# Patient Record
Sex: FEMALE | Race: WHITE | HISPANIC OR LATINO | Employment: FULL TIME | URBAN - METROPOLITAN AREA
[De-identification: names, ages, dates, MRNs, and addresses within clinical notes are randomized per-mention and may not be internally consistent; named-entity substitution may affect disease eponyms.]

---

## 2017-10-05 ENCOUNTER — GENERIC CONVERSION - ENCOUNTER (OUTPATIENT)
Dept: OTHER | Facility: OTHER | Age: 27
End: 2017-10-05

## 2017-10-10 ENCOUNTER — GENERIC CONVERSION - ENCOUNTER (OUTPATIENT)
Dept: OTHER | Facility: OTHER | Age: 27
End: 2017-10-10

## 2017-10-10 ENCOUNTER — APPOINTMENT (OUTPATIENT)
Dept: PERINATAL CARE | Facility: CLINIC | Age: 27
End: 2017-10-10
Payer: COMMERCIAL

## 2017-10-10 PROCEDURE — 76801 OB US < 14 WKS SINGLE FETUS: CPT | Performed by: OBSTETRICS & GYNECOLOGY

## 2018-01-10 NOTE — PROGRESS NOTES
OCT 10 2017         RE: Ralf Amin                                To: LEYLA Aguilar    MR#: [id number]                                   [address] [facility]    : [address], [address] [facility]   ENC: [phone]:[facility]                             Fax: [phone]   (Exam #: [id number])      The LMP of this 32year old,  G2, P1-0-0-1 patient was 2017, her   working KIN is MAY 8 2018 and the current gestational age is 9 weeks 0   days by [address] [place] [address]  A sonographic examination was performed on OCT   10 2017 using real time equipment  The ultrasound examination was   performed using abdominal technique  The patient has a BMI of 20 8  Her   blood pressure today was 106/67  Earliest US on record:  17  8w1d  KIN 18 Multiple longitudinal   and transverse sections revealed a conde intrauterine pregnancy  A normal gestational sac was documented  A smaller than expected fetal   pole was visualized  Cardiac motion was not observed  The yolk sac was not   seen  INDICATIONS      viability      Exam Types      Level I      MEASUREMENTS (* Included In Average GA)      CRL              2 2 cm        8 weeks 5 days *      THE AVERAGE GESTATIONAL AGE is 8 weeks 5 days +/- 5 days  ANATOMY COMMENTS      Transabdominal sonography reveals a single non-viable intrauterine   pregnancy  The crown-rump length is consistent with 8-5/7 weeks size  Absence of fetal heart rate activity is verified on real-time evaluation,   M-mode and color Doppler assessment  The gestational sac appears normal    The uterine contour appears normal  Free fluid is not identified in the   posterior cul-de-sac  There is no suspicion of a uterine myoma  The right   ovary is normal in appearance  The left ovary cannot be imaged well  The   ultrasound findings were consistent with a missed   ADNEXA      The left ovary was not visualized   The right ovary appeared normal and   measured 3 0 x 1 9 x 1 4 cm with a volume of 4 2 cc  IMPRESSION      Funk IUP   8 weeks and 5 days by this ultrasound  (KIN=MAY 17 2018)   10 weeks and 0 days by 1st Tri Sono  (KIN=MAY 8 2018)   Heart movement not seen      NIKKI Huang M D     Maternal-Fetal Medicine   Electronically signed 10/10/17 12:05

## 2018-01-12 NOTE — PROGRESS NOTES
2016         RE: Portillo Balta                                To: LEYLA Hopson    MR#: [id number]                                   [address]: [address], [address] [facility]   ENC:                                              Fax: [phone]   (Exam #: [id number])      The LMP of this 30year old,  1, para 0 patient was MAY 8 2015,   giving her an KIN of 2016 and a current gestational age of 43 weeks   5 days by dates  A sonographic examination was performed on 2016   using real time equipment  The ultrasound examination was performed using   abdominal technique  Earliest ultrasound found in her record: 7/2/15 7e3d 2/15/16 KIN            Cardiac motion was observed at 148 bpm       INDICATIONS      abnormal uterine Doppler   post dates   fetal growth      Exam Types      Level I   Biophysical Profile      RESULTS      Fetus # 1 of 1   Vertex presentation   Possible macrosomia   Placenta Location = Posterior   No placenta previa   Placenta Grade = II      MEASUREMENTS (* Included In Average GA)      AC              37 4 cm        41 weeks 5 days*   BPD             10 3 cm        42 weeks 4 days*   HC              35 9 cm        41 weeks 4 days*   Femur            7 8 cm        38 weeks 6 days*      Humerus          6 8 cm        39 weeks 2 days      Cerebellum       5 7 cm        36 weeks 1 day      HC/AC           0 96   FL/AC           0 21   FL/BPD          0 75   EFW (Ac/Fl/Hc)  4254 grams - 9 lbs 6 oz      THE AVERAGE GESTATIONAL AGE is 41 weeks 1 day +/- 21 days        AMNIOTIC FLUID      Q1: 6 1      Q2: 4 4      Q3: 7 0      Q4:   KEIRA Total = 17 5 cm   Amniotic Fluid: Normal      FETAL VESSELS                                     S/D   PI    RI    PSV   AEDV RF                                                    cm/s       Umbilical Artery                 0 77              No   No      ANATOMY Head                                    Normal   Stomach                                 Normal   Right Kidney                            Normal   Left Kidney                             Normal   Bladder                                 Normal   Placenta                                Normal      ANATOMY DETAILS      Visualized Appearing Sonographically Normal:   HEAD: (Calvarium, BPD Level, Lateral Ventricles, Choroid Plexus,   Cerebellum, Cisterna Magna);    STOMACH, RIGHT KIDNEY, LEFT KIDNEY,   BLADDER, PLACENTA      BIOPHYSICAL PROFILE      The Biophysical Profile score was 8/8  Breathin  Movement: 2  Tone: 2  AFV: 2      IMPRESSION      Funk IUP   41 weeks and 1 day by this ultrasound  (KIN=2016)   Vertex presentation   Possible macrosomia   Regular fetal heart rate of 148 bpm   Posterior placenta   No placenta previa      GENERAL COMMENT      I had the pleasure of speaking to Danny Alvarez and her  today  Her back is currently 40 weeks and 5 days  Today on the ultrasound the   fetus was vertex in presentation  The fetus has an estimated weight of 9   lbs  6 oz , at 4254 grams  The amniotic fluid appeared normal and the   umbilical artery Doppler flow study was normal  The nonstress test was   reactive today as well  Overall I was concerned about the size of this fetus and the potential   risk for shoulder dystocia  I did tell Ronyarleen Cuellarnazsara to have a discussion with   you regarding the options of delivery  I told her personally that my   option would be to do a primary  section although I do believe she   wants to come and talk to you  We discussed kick counts in the office today  We discussed the 10 kicks in   2 hour rule  I asked her to call your office if criteria are not met  She   should continue to do her kick counts on a daily basis        Total face-to-face time with the patient, excluding ultrasound time was 15   minutes with more than 50% of the time devoted to counseling and   coordination of care  Thank you very much for allowing me to participate in the care of your   patient  If you have any questions or concerns about today's visit, please   do not hesitate to call me  Sincerely,      TIMO Lomas R D M S Judge Ruths, M D     Maternal-Fetal Medicine   Electronically signed 02/17/16 10:51

## 2018-01-22 VITALS
DIASTOLIC BLOOD PRESSURE: 67 MMHG | HEIGHT: 64 IN | WEIGHT: 121 LBS | SYSTOLIC BLOOD PRESSURE: 106 MMHG | BODY MASS INDEX: 20.66 KG/M2

## 2018-09-17 ENCOUNTER — TRANSCRIBE ORDERS (OUTPATIENT)
Dept: PERINATAL CARE | Facility: CLINIC | Age: 28
End: 2018-09-17

## 2018-09-17 DIAGNOSIS — O09.90 HIGH-RISK PREGNANCY SUPERVISION, UNSPECIFIED TRIMESTER: Primary | ICD-10-CM

## 2018-09-20 ENCOUNTER — ROUTINE PRENATAL (OUTPATIENT)
Dept: PERINATAL CARE | Facility: CLINIC | Age: 28
End: 2018-09-20
Payer: COMMERCIAL

## 2018-09-20 VITALS
WEIGHT: 124 LBS | SYSTOLIC BLOOD PRESSURE: 111 MMHG | HEART RATE: 90 BPM | HEIGHT: 63 IN | BODY MASS INDEX: 21.97 KG/M2 | DIASTOLIC BLOOD PRESSURE: 76 MMHG

## 2018-09-20 DIAGNOSIS — O09.90 HIGH-RISK PREGNANCY SUPERVISION, UNSPECIFIED TRIMESTER: ICD-10-CM

## 2018-09-20 DIAGNOSIS — Z3A.01 7 WEEKS GESTATION OF PREGNANCY: Primary | ICD-10-CM

## 2018-09-20 PROCEDURE — 76801 OB US < 14 WKS SINGLE FETUS: CPT | Performed by: OBSTETRICS & GYNECOLOGY

## 2018-09-20 RX ORDER — ASPIRIN 81 MG/1
81 TABLET ORAL DAILY
COMMUNITY
End: 2021-04-12 | Stop reason: ALTCHOICE

## 2018-09-20 NOTE — PROGRESS NOTES
An ultrasound for viability, dating  was completed today  See Ob Procedures in EPIC  Ultrasound:  1  Viable fetus with CRL= 1 5 cm = 7 weeks 6 days KIN 05-03-19  I reviewed the results of this ultrasound with Ms Renetta Scanlon and her partner  She declined an appointment at 12-14 weeks to screen for fetal aneuploidy  Recommendations;  1  Follow-up  ultrasound at 20 weeks for fetal anatomy which was scheduled today      Mehnaz Bentley MD

## 2018-12-14 ENCOUNTER — ROUTINE PRENATAL (OUTPATIENT)
Dept: PERINATAL CARE | Facility: CLINIC | Age: 28
End: 2018-12-14
Payer: COMMERCIAL

## 2018-12-14 VITALS
BODY MASS INDEX: 23.81 KG/M2 | WEIGHT: 134.4 LBS | SYSTOLIC BLOOD PRESSURE: 116 MMHG | HEIGHT: 63 IN | DIASTOLIC BLOOD PRESSURE: 75 MMHG | HEART RATE: 73 BPM

## 2018-12-14 DIAGNOSIS — Z36.86 ENCOUNTER FOR ANTENATAL SCREENING FOR CERVICAL LENGTH: ICD-10-CM

## 2018-12-14 DIAGNOSIS — O34.211 MATERNAL CARE DUE TO LOW TRANSVERSE UTERINE SCAR FROM PREVIOUS CESAREAN DELIVERY: ICD-10-CM

## 2018-12-14 DIAGNOSIS — Z36.3 ENCOUNTER FOR ANTENATAL SCREENING FOR MALFORMATION USING ULTRASOUND: Primary | ICD-10-CM

## 2018-12-14 DIAGNOSIS — Z3A.20 20 WEEKS GESTATION OF PREGNANCY: ICD-10-CM

## 2018-12-14 PROBLEM — O99.210 OBESITY AFFECTING PREGNANCY, ANTEPARTUM: Status: RESOLVED | Noted: 2018-12-14 | Resolved: 2018-12-14

## 2018-12-14 PROBLEM — O99.210 OBESITY AFFECTING PREGNANCY, ANTEPARTUM: Status: ACTIVE | Noted: 2018-12-14

## 2018-12-14 PROCEDURE — 76817 TRANSVAGINAL US OBSTETRIC: CPT | Performed by: OBSTETRICS & GYNECOLOGY

## 2018-12-14 PROCEDURE — 76805 OB US >/= 14 WKS SNGL FETUS: CPT | Performed by: OBSTETRICS & GYNECOLOGY

## 2018-12-14 PROCEDURE — 99212 OFFICE O/P EST SF 10 MIN: CPT | Performed by: OBSTETRICS & GYNECOLOGY

## 2018-12-14 NOTE — PROGRESS NOTES
The patient was seen today for an ultrasound  Please see ultrasound report (located under Ob Procedures) for additional details  Thank you very much for allowing us to participate in the care of this very nice patient  Should you have any questions, please do not hesitate to contact me  Darrell Maza MD [address] [facility]  Attending Physician, Hakeem

## 2018-12-14 NOTE — PROGRESS NOTES
A transvaginal ultrasound was performed   Sonographer note on use of High Level Disinfection Process (Trophon) for transvaginal probe# 2 used, serial #[id number]  Blank Lara RDMS

## 2019-02-15 ENCOUNTER — TRANSCRIBE ORDERS (OUTPATIENT)
Dept: PERINATAL CARE | Facility: CLINIC | Age: 29
End: 2019-02-15

## 2019-02-18 ENCOUNTER — TRANSCRIBE ORDERS (OUTPATIENT)
Dept: PERINATAL CARE | Facility: CLINIC | Age: 29
End: 2019-02-18

## 2019-02-18 DIAGNOSIS — O99.810 ABNORMAL MATERNAL GLUCOSE TOLERANCE, ANTEPARTUM: Primary | ICD-10-CM

## 2019-02-19 ENCOUNTER — OFFICE VISIT (OUTPATIENT)
Dept: PERINATAL CARE | Facility: CLINIC | Age: 29
End: 2019-02-19
Payer: COMMERCIAL

## 2019-02-19 DIAGNOSIS — Z3A.29 29 WEEKS GESTATION OF PREGNANCY: ICD-10-CM

## 2019-02-19 DIAGNOSIS — O24.410 DIET CONTROLLED GESTATIONAL DIABETES MELLITUS (GDM) IN THIRD TRIMESTER: Primary | ICD-10-CM

## 2019-02-19 DIAGNOSIS — O99.810 ABNORMAL MATERNAL GLUCOSE TOLERANCE, ANTEPARTUM: ICD-10-CM

## 2019-02-19 PROCEDURE — G0109 DIAB MANAGE TRN IND/GROUP: HCPCS | Performed by: DIETITIAN, REGISTERED

## 2019-02-19 NOTE — PROGRESS NOTES
19  Denisha Duran   1990  Estimated Date of Delivery: 5/3/2019    30 4/7 weeks    Dear Dr Jaida Mckinley: Thank you for referring your patient to the Diabetes and Pregnancy Program at [address] [place] [address]  The patient attended class 1 and patient received the following education:     Pathophysiology of diabetes and pregnancy  This includes maternal-fetal complications such as fetal macrosomia,  hypoglycemia, polyhydramnios, increased incidence of  section, pre-term labor and in severe cases, fetal demise and stillbirth   Instruction on diet and glucometer use was provided  Self-monitoring of blood glucose levels: fasting (goal 60mg/dl to 90mg/dl) and two hours after the start of the meal less (goal less than 120mg/dl)  The patient was provided with a One-Touch Verio blood glucose meter and supplies  Blood glucose during demonstration was 97    Medical Nutrition Therapy for diabetes and pregnancy  The patient was provided with a 1900 calorie meal plan and the following was reviewed:     o Basic review of macronutrients   o Meal pattern should consist of three small meals and three snacks daily  o Carbohydrate gram amounts per meal   o Appropriate serving sizes for carbohydrates and proteins  o Incorporating protein at each meal and snack  o Maintain a three day food diary and bring to class 2    Report blood glucose levels [facility] the [facility] or as directed:  o Phone : [phone]  If no response in 24 hours, call [phone]   o Fax: [phone]  o Email: [email or web address]  The patient is scheduled to attend class 2 on Tuesday, 19  Additionally, fetal ultrasound evaluation by the Perinatologist has been scheduled to assure continuity of care  Please contact the Diabetes and Pregnancy Program at [phone] if you have any questions    Time spent with patient 2:15-3:25 PM; time spent face to face counseling greater than 50% of the appointment      Sincerely,   Hola Bojorquez  Diabetes Educator   Diabetes and Pregnancy Program

## 2019-02-21 RX ORDER — BLOOD SUGAR DIAGNOSTIC
STRIP MISCELLANEOUS
Qty: 100 EACH | Refills: 4 | Status: SHIPPED | OUTPATIENT
Start: 2019-02-21 | End: 2019-05-03

## 2019-02-21 RX ORDER — LANCETS 33 GAUGE
EACH MISCELLANEOUS
Qty: 100 EACH | Refills: 3 | Status: SHIPPED | OUTPATIENT
Start: 2019-02-21 | End: 2019-05-03

## 2019-02-26 ENCOUNTER — DOCUMENTATION (OUTPATIENT)
Dept: PERINATAL CARE | Facility: CLINIC | Age: 29
End: 2019-02-26

## 2019-02-26 ENCOUNTER — OFFICE VISIT (OUTPATIENT)
Dept: PERINATAL CARE | Facility: CLINIC | Age: 29
End: 2019-02-26
Payer: COMMERCIAL

## 2019-02-26 DIAGNOSIS — O24.410 DIET CONTROLLED GESTATIONAL DIABETES MELLITUS (GDM) IN THIRD TRIMESTER: Primary | ICD-10-CM

## 2019-02-26 DIAGNOSIS — Z3A.30 30 WEEKS GESTATION OF PREGNANCY: ICD-10-CM

## 2019-02-26 PROCEDURE — G0109 DIAB MANAGE TRN IND/GROUP: HCPCS | Performed by: DIETITIAN, REGISTERED

## 2019-02-26 NOTE — PROGRESS NOTES
Date:  19  RE: Mariia Vasquez    : 1990  Estimated Date of Delivery: 5/3/2019  EGA: 30 4/7 weeks  OB/GYN: Angie Cabrera  Diet controlled gestational diabetes      Date Fasting Post-  breakfast Post-  lunch Post-  dinner Before bedtime Carbs Comments   19   97 95      19 81 67 114 84      19 77   90   No Strips   19 84 90 100 85      19 86 76 97 95      19 87 70 116 81      19 83 87 100 95      19 75 99            Current regimen:  1900 calorie GDM diet with 3 meals & 3 snacks  Self-Blood Glucose monitoring 4 times daily    Plan:  Continue current regimen  Advised patient to add up to 5 oz protein at both lunch & dinner  May need more calories due to active job  Lost 4 pounds in 1 week per patient      Date due to report next:  Tuesday, 3/5/19    Tom Patel  Diabetes Educator   Diabetes and Pregnancy Program

## 2019-02-26 NOTE — PROGRESS NOTES
DATE:  19  RE: Bishop Abarca    : 1990    KIN: Estimated Date of Delivery: 5/3/2019    EGA: 30 4/7 weeks    Dear Dr Niyah Carlos: Thank you for referring your patient to the Diabetes and Pregnancy Program at [address]  The patient attended Class 2 received the following education:    Weight gain during in pregnancy  Based on the patients height of 63   inches, pre-pregnancy weight of 124 pounds (BMI-22 0), we would recommend a total weight gain of 25-35 pounds for the pregnancy   The patients current weight is 150  pounds, and her weight gain to date is 26 pounds   Medical Nutrition Therapy for diabetes and pregnancy  The patients three day food diary was reviewed and discussed  The patient was instructed on the following:  o Individualized meal plan    o Use of food diary to maintain a meal plan    o Importance of protein as it relates to blood glucose control   Review of blood glucose log  Reinforcement of blood glucose goals and reporting guidelines   Ultrasounds every four weeks in the [facility] to evaluate fetal growth   Exercise Guidelines:   o Walking up to thirty minutes daily can reduce blood glucose levels  o Monitor for greater than four contractions per hour     o The patient has been instructed not to begin physical activity if she has been instructed not to exercise by your office   Sick day guidelines and hypoglycemia with treatment   Post-partum guidelines:  o Completion of a 75 gram glucose tolerance test at 6 weeks post-partum to check for type 2 diabetes  o 20% weight loss and 30 minutes of exercise 5 times per week reduces the risk of type 2 diabetes   Breastfeeding guidelines   Report blood glucose levels to [address] weekly or as directed  o Phone: [phone]   If no response in 24 hours, call [phone]    o Fax: [phone]  o Email: [email or web address]    Patient brought long list of questions to this class  All questions were answered  Understands all information well  Please contact the Diabetes and Pregnancy Program at [phone] if you have questions  Time spent with patient 3:35-5:15 PM; time spent face to face counseling greater than 50% of the appointment      Sincerely,     Patrick Olivares  Diabetes Educator  Diabetes and Pregnancy Program

## 2019-02-28 ENCOUNTER — TRANSCRIBE ORDERS (OUTPATIENT)
Dept: PERINATAL CARE | Facility: CLINIC | Age: 29
End: 2019-02-28

## 2019-02-28 DIAGNOSIS — O09.90 SUPERVISION OF HIGH-RISK PREGNANCY: Primary | ICD-10-CM

## 2019-03-04 ENCOUNTER — DOCUMENTATION (OUTPATIENT)
Dept: PERINATAL CARE | Facility: CLINIC | Age: 29
End: 2019-03-04

## 2019-03-04 NOTE — PROGRESS NOTES
Date:  19  RE: Rafael Joaquin    : 1990  Estimated Date of Delivery: 5/3/2019  EGA: 31 3/ weeks  OB/GYN: Reji Farmer  Diet controlled gestational diabetes    Date Fasting Post-  breakfast Post-  lunch Post-  dinner Before bedtime Carbs Comments   19 83 87 100 95      19 81 75 99 100      19 82 92 73 92      19 90 93 81 105      3/1/19 80 90 105 76      3/2/19 89 74 110 99      3/3/19 87 77 90 76        Current regimen:  1900 calorie GDM diet with 3 meals & 3 snacks  Self-Blood Glucose monitoring 4 times daily    Plan:  Continue current regimen  Advised patient to add up to 5 oz protein at both lunch & dinner  May need more calories due to active job  Lost 4 pounds in 1 week per patient      Date due to report next:  Monday, 3/11/19    Connor Wood  Diabetes Educator   Diabetes and Pregnancy Program

## 2019-03-11 ENCOUNTER — DOCUMENTATION (OUTPATIENT)
Dept: PERINATAL CARE | Facility: CLINIC | Age: 29
End: 2019-03-11

## 2019-03-11 NOTE — PROGRESS NOTES
Date:  19  RE: Jose J Brock    : 1990  Estimated Date of Delivery: 5/3/2019  EGA: 32w3d  OB/GYN: Aline Dudley  Diet controlled gestational diabetes      Blood glucose log from patient e-mail  Current regimen:  1900 calorie GDM diet with 3 meals/snacks including protein, previously advised to consume 5 protein servings at bothe lunch and dinner due to active job and reported weight loss with diet initiation  Self-Blood Glucose monitoring fasting and 2 hours after start of meals with One touch Verio meter      Plan:  Continue current regimen    Date due to report next:  Monday, 3-18-19    Vidhi Patel, RN BS CDE  Diabetes Educator   Diabetes and Pregnancy Program

## 2019-03-14 ENCOUNTER — ULTRASOUND (OUTPATIENT)
Dept: PERINATAL CARE | Facility: CLINIC | Age: 29
End: 2019-03-14
Payer: COMMERCIAL

## 2019-03-14 VITALS
RESPIRATION RATE: 18 BRPM | HEIGHT: 63 IN | BODY MASS INDEX: 26.05 KG/M2 | HEART RATE: 77 BPM | DIASTOLIC BLOOD PRESSURE: 74 MMHG | WEIGHT: 147 LBS | SYSTOLIC BLOOD PRESSURE: 116 MMHG

## 2019-03-14 DIAGNOSIS — Z3A.32 32 WEEKS GESTATION OF PREGNANCY: ICD-10-CM

## 2019-03-14 DIAGNOSIS — O24.410 DIET CONTROLLED GESTATIONAL DIABETES MELLITUS (GDM) IN THIRD TRIMESTER: Primary | ICD-10-CM

## 2019-03-14 PROCEDURE — 76816 OB US FOLLOW-UP PER FETUS: CPT | Performed by: OBSTETRICS & GYNECOLOGY

## 2019-03-14 PROCEDURE — 99212 OFFICE O/P EST SF 10 MIN: CPT | Performed by: OBSTETRICS & GYNECOLOGY

## 2019-03-14 RX ORDER — FERROUS SULFATE 325(65) MG
325 TABLET ORAL 2 TIMES DAILY
COMMUNITY
End: 2021-04-12 | Stop reason: ALTCHOICE

## 2019-03-14 NOTE — PATIENT INSTRUCTIONS
Kick Counts in Pregnancy   AMBULATORY CARE:   Kick counts  measure how much your baby is moving in your womb  A kick from your baby can be felt as a twist, turn, swish, roll, or jab  It is common to feel your baby kicking at 26 to 28 weeks of pregnancy  You may feel your baby kick as early as 20 weeks of pregnancy  Seek care immediately if:   · You feel your baby kick less as the day goes on      · You do not feel any kicks in a day  Contact your healthcare provider if:   · You feel a change in the number of kicks or movements of your baby  · You feel fewer than 10 kicks within 2 hours after counting  · You have questions or concerns about your baby's movements  Why measure kick counts:  Your baby's movement may provide information about your baby's health  He may move less, or not at all, if there are problems  He may move less if he does not have enough room to grow in your uterus (womb)  He may also move less if he is not getting enough oxygen or nutrition from the placenta  Tell your healthcare provider as soon as you feel a change in your baby's movements  Problems that are found earlier are easier to treat  When to measure kick counts:   · Measure kick counts at the same time every day  · Measure kick counts when your baby is awake and most active  Your baby may be most active in the evening  · Measure kick counts after a meal or snack or when your baby is usually most active  Your baby may be more active after you eat or in the evening  · You should not smoke while you are pregnant  Smoking increases the risk of health problems for you and for your baby during your pregnancy  If you do smoke, wait 2 hours to measure kick counts  Nicotine can make your baby more active than usual   How to measure kick counts:  Check that your baby is awake before you measure kick counts  You can wake up your baby by lightly pushing on your belly, walking, or drinking something cold   Your healthcare provider may tell you different ways to measure kick counts  He/She may tell you to do the following:  · Use a chart or clock to keep track of the time you start and finish counting  · Sit in a chair or lie on your left side  · Place your hands on the largest part of your belly  · Count until you reach 10 kicks  Write down how much time it takes to count 10 kicks  · It may take 30 minutes to 2 hours to count 10 kicks  It should not take more than 2 hours to count 10 kicks  If you do not feel 10 kicks within 2 hours, Call your Obstetrician    Follow up with your healthcare provider as directed:  Write down your questions so you remember to ask them during your visits  © 2017 [address] [employer] Information is for End User's use only and may not be sold, redistributed or otherwise used for commercial purposes  All illustrations and images included in CareNotes® are the copyrighted property of A D A M , Inc  or Sherman Malave  The above information is an  only  It is not intended as medical advice for individual conditions or treatments  Talk to your doctor, nurse or pharmacist before following any medical regimen to see if it is safe and effective for you

## 2019-03-14 NOTE — PROGRESS NOTES
The patient was seen today for an ultrasound  Please see ultrasound report (located under Ob Procedures) for additional details  Thank you very much for allowing us to participate in the care of this very nice patient  Should you have any questions, please do not hesitate to contact me  Darrell Montero MD [address]  Attending Physician, [place]em

## 2019-03-18 ENCOUNTER — DOCUMENTATION (OUTPATIENT)
Dept: PERINATAL CARE | Facility: CLINIC | Age: 29
End: 2019-03-18

## 2019-03-18 NOTE — PROGRESS NOTES
Date:  19  RE: Zee Crowe    : 1990  Estimated Date of Delivery: 5/3/2019  EGA: 33w3d  OB/GYN: Daniel Rhodes  Diet controlled gestational diabetes      Blood glucose log from patient e-mail  Current regimen:  1900 calorie GDM diet with 3 meals/snacks including protein, previously advised to consume 5 protein servings at both lunch and dinner due to active job and reported weight loss with diet initiation  Self-Blood Glucose monitoring fasting and 2 hours after start of meals with One touch Verio meter      Plan:  Continue current regimen    Date due to report next:  Monday, 3-25-19    Vidhi Patel, RN BS CDE  Diabetes Educator   Diabetes and Pregnancy Program

## 2019-03-25 ENCOUNTER — DOCUMENTATION (OUTPATIENT)
Dept: PERINATAL CARE | Facility: CLINIC | Age: 29
End: 2019-03-25

## 2019-03-25 NOTE — PROGRESS NOTES
Wanda Trinidad is a  25 y o  female at 26w3d gestation, with an Estimated Date of Delivery: 5/3/19  Blood glucose log submission reviewed and updates to treatment plan provided as indicated  Reviewed and updated the following from patients medical record: PMH, Problem List, Allergies, and Current Medications  Diagnosis:  Diet Controlled GDM third trimester    Labs:  Lab Results   Component Value Date    [id number] 2015     No results found for: HGBA1C     Medications:  Current Outpatient Medications on File Prior to Visit   Medication Sig Dispense Refill    aspirin (ECOTRIN LOW STRENGTH) 81 mg EC tablet Take 81 mg by mouth daily      ferrous sulfate 325 (65 Fe) mg tablet Take 325 mg by mouth 2 (two) times a day      ONETOUCH DELICA LANCETS 38Y MISC Test 4 times daily  Diet controlled gestational diabetes  100 each 3    ONETOUCH VERIO test strip Test 4 times daily  Diet controlled gestational diabetes  100 each 4    Prenatal Vit-Iron Carbonyl-FA (PRENATAL MULTIVITAMIN) TABS Take 1 tablet by mouth daily  No current facility-administered medications on file prior to visit        Anthropometrics:  No components found for: LASTHT  Wt Readings from Last 3 Encounters:   19 66 7 kg (147 lb)   18 61 kg (134 lb 6 4 oz)   18 56 2 kg (124 lb)   Pregravid weight not on file    Recent Ultrasound for Growth Shows:   DATE:3/14- fetal growth appeared normal, normal KEIRA    Blood Sugar Logs Submitted via: Email  Dates: 3/18-3/24      Current regimen:  Meal Plan: 1900 calorie  Medication (if applicable):   N/A    Plan:   BG Testin x per day (Fasting, 2 hour after start of each meal)  Meal Plan: 1900 calories   Diet instruction: Continue current meal plan   - 3 meals and 3 snacks daily, including protein at each   - No more than 10 hours fasting overnight     Medication (if applicable):   N/A   Exercise: If okay by physician, recommend up to 30 minutes of physical activity daily      Upcoming Lab Work: none  Prescription Refills needed: None    Date to report next: Monday 4/1      Begin Time: 1:23 pm  End Time: 1:30 pm    Garrett Kussmaul RD, LDN  Diabetes Educator  [facility] Maternal Fetal Medicine

## 2019-04-01 ENCOUNTER — DOCUMENTATION (OUTPATIENT)
Dept: PERINATAL CARE | Facility: CLINIC | Age: 29
End: 2019-04-01

## 2019-04-05 ENCOUNTER — DOCUMENTATION (OUTPATIENT)
Dept: PERINATAL CARE | Facility: CLINIC | Age: 29
End: 2019-04-05

## 2019-04-05 ENCOUNTER — ULTRASOUND (OUTPATIENT)
Dept: PERINATAL CARE | Facility: CLINIC | Age: 29
End: 2019-04-05
Payer: COMMERCIAL

## 2019-04-05 VITALS
SYSTOLIC BLOOD PRESSURE: 95 MMHG | BODY MASS INDEX: 26.51 KG/M2 | HEIGHT: 63 IN | HEART RATE: 73 BPM | DIASTOLIC BLOOD PRESSURE: 55 MMHG | WEIGHT: 149.6 LBS

## 2019-04-05 DIAGNOSIS — Z98.891 HX OF CESAREAN SECTION: ICD-10-CM

## 2019-04-05 DIAGNOSIS — O24.410 DIET CONTROLLED GESTATIONAL DIABETES MELLITUS (GDM) IN THIRD TRIMESTER: Primary | ICD-10-CM

## 2019-04-05 DIAGNOSIS — O09.90 SUPERVISION OF HIGH-RISK PREGNANCY: ICD-10-CM

## 2019-04-05 DIAGNOSIS — Z3A.36 36 WEEKS GESTATION OF PREGNANCY: ICD-10-CM

## 2019-04-05 PROCEDURE — 76816 OB US FOLLOW-UP PER FETUS: CPT | Performed by: OBSTETRICS & GYNECOLOGY

## 2019-04-05 PROCEDURE — 99212 OFFICE O/P EST SF 10 MIN: CPT | Performed by: OBSTETRICS & GYNECOLOGY

## 2019-04-08 ENCOUNTER — DOCUMENTATION (OUTPATIENT)
Dept: PERINATAL CARE | Facility: CLINIC | Age: 29
End: 2019-04-08

## 2019-04-15 ENCOUNTER — DOCUMENTATION (OUTPATIENT)
Dept: PERINATAL CARE | Facility: CLINIC | Age: 29
End: 2019-04-15

## 2019-04-22 ENCOUNTER — DOCUMENTATION (OUTPATIENT)
Dept: PERINATAL CARE | Facility: CLINIC | Age: 29
End: 2019-04-22

## 2019-04-29 ENCOUNTER — DOCUMENTATION (OUTPATIENT)
Dept: PERINATAL CARE | Facility: CLINIC | Age: 29
End: 2019-04-29

## 2021-01-15 ENCOUNTER — TRANSCRIBE ORDERS (OUTPATIENT)
Dept: PERINATAL CARE | Facility: CLINIC | Age: 31
End: 2021-01-15

## 2021-01-15 DIAGNOSIS — O09.899 SUPERVISION OF OTHER HIGH RISK PREGNANCIES, UNSPECIFIED TRIMESTER: Primary | ICD-10-CM

## 2021-04-12 ENCOUNTER — ROUTINE PRENATAL (OUTPATIENT)
Dept: PERINATAL CARE | Facility: CLINIC | Age: 31
End: 2021-04-12
Payer: COMMERCIAL

## 2021-04-12 VITALS
DIASTOLIC BLOOD PRESSURE: 64 MMHG | WEIGHT: 145.2 LBS | SYSTOLIC BLOOD PRESSURE: 115 MMHG | HEART RATE: 77 BPM | HEIGHT: 63 IN | BODY MASS INDEX: 25.73 KG/M2

## 2021-04-12 DIAGNOSIS — O09.292 PRIOR FETAL MACROSOMIA IN SECOND TRIMESTER, ANTEPARTUM: ICD-10-CM

## 2021-04-12 DIAGNOSIS — O09.292 HISTORY OF GESTATIONAL DIABETES MELLITUS (GDM) IN PRIOR PREGNANCY, CURRENTLY PREGNANT IN SECOND TRIMESTER: ICD-10-CM

## 2021-04-12 DIAGNOSIS — Z3A.20 20 WEEKS GESTATION OF PREGNANCY: ICD-10-CM

## 2021-04-12 DIAGNOSIS — O09.899 SUPERVISION OF OTHER HIGH RISK PREGNANCIES, UNSPECIFIED TRIMESTER: ICD-10-CM

## 2021-04-12 DIAGNOSIS — Z36.86 ENCOUNTER FOR ANTENATAL SCREENING FOR CERVICAL LENGTH: ICD-10-CM

## 2021-04-12 DIAGNOSIS — O34.211 MATERNAL CARE DUE TO LOW TRANSVERSE UTERINE SCAR FROM PREVIOUS CESAREAN DELIVERY: Primary | ICD-10-CM

## 2021-04-12 DIAGNOSIS — Z86.32 HISTORY OF GESTATIONAL DIABETES MELLITUS (GDM) IN PRIOR PREGNANCY, CURRENTLY PREGNANT IN SECOND TRIMESTER: ICD-10-CM

## 2021-04-12 PROBLEM — G43.909 MIGRAINES: Status: ACTIVE | Noted: 2019-04-29

## 2021-04-12 PROCEDURE — 99241 PR OFFICE CONSULTATION NEW/ESTAB PATIENT 15 MIN: CPT | Performed by: OBSTETRICS & GYNECOLOGY

## 2021-04-12 PROCEDURE — 76817 TRANSVAGINAL US OBSTETRIC: CPT | Performed by: OBSTETRICS & GYNECOLOGY

## 2021-04-12 PROCEDURE — 76811 OB US DETAILED SNGL FETUS: CPT | Performed by: OBSTETRICS & GYNECOLOGY

## 2021-04-12 NOTE — LETTER
2021     Bennettanushamary Thapa   [place]  [address] [place] [address] [address]    Patient: Chelsi Agee   YOB: 1990   Date of Visit: 2021       Dear Dr Syed Lira: Thank you for referring Joselo Dallas to me for evaluation  Below are my notes for this consultation  If you have questions, please do not hesitate to call me  I look forward to following your patient along with you  Sincerely,        Yolanda Cronin MD        CC: No Recipients  Yolanda Cronin MD  2021 10:21 PM  Sign when Signing Visit  OFFICE CONSULT  Man[facility] [place]  [place],  [address]     Dear Dr Syed Lira     Thank you for requesting a  consultation on your patient Chelsi Agee for the following indications:  Fetal anatomical survey    History   Dany Fernandez reports that she is on prenatal vitamins and has an allergy to amoxicillin  Her medical history includes gestational diabetes in a prior pregnancy  Surgical history includes knee surgery x3 and a history of a  section  Her OB history includes a 41 week  section for a baby boy on 16  Her son weighed 9 pounds 12 ounces  She states  She had a borderline screen for gestational diabetes in that pregnancy  On 19 at 39 weeks and 4 days she had a 7 pound 4 ounce baby girl vaginally without complication  She reports she did have gestational diabetes which was well controlled on diet in this pregnancy  In 2017 at 10 weeks she had a miscarriage that did not require a D&E and in 2020 at 6 weeks she also had a another miscarriage that did not require a D&E  She denies any use of cigarettes, alcohol or illicit drugs in pregnancy  She reports no 1st generation family history of issues  She declined any genetic screening in this pregnancy    She reports she  Had chronic migraines earlier in pregnancy that responded to Fioricet but recently they seemed to have decreased in frequency  She is planning on a  with this pregnancy  Ultrasound findings: the ultrasound today shows a fetus that is growing concordant with her dates  The cervical length, amniotic fluid and placental location appear normal   No malformations are detected on today's ultrasound  The patient was informed of the findings and counseled about the limitations of the exam in detecting all forms of fetal congenital abnormalities  She does not report any vaginal bleeding or uterine cramping/contractions  She does feel fetal movement  Follow up recommended:   Recommend a follow-up ultrasound for growth and missed anatomy at 28 weeks given her history of a prior macrosomic baby and her history of gestational diabetes and a prior   The majority of time (greater then 50%) was spent on counseling and coordination of care of this patient and /or family member  Approximate face to face time was 15 minutes            Zohaib Dsouza MD

## 2021-04-13 NOTE — PROGRESS NOTES
OFFICE CONSULT  Lizbeth Stanley, Do  [facility],  [address]     Dear Dr Libby Gore     Thank you for requesting a  consultation on your patient Nicole Johnson for the following indications:  Fetal anatomical survey    History   Isiah Crespo reports that she is on prenatal vitamins and has an allergy to amoxicillin  Her medical history includes gestational diabetes in a prior pregnancy  Surgical history includes knee surgery x3 and a history of a  section  Her OB history includes a 41 week  section for a baby boy on 16  Her son weighed 9 pounds 12 ounces  She states  She had a borderline screen for gestational diabetes in that pregnancy  On 19 at 39 weeks and 4 days she had a 7 pound 4 ounce baby girl vaginally without complication  She reports she did have gestational diabetes which was well controlled on diet in this pregnancy  In 2017 at 10 weeks she had a miscarriage that did not require a D&E and in 2020 at 6 weeks she also had a another miscarriage that did not require a D&E  She denies any use of cigarettes, alcohol or illicit drugs in pregnancy  She reports no 1st generation family history of issues  She declined any genetic screening in this pregnancy  She reports she  Had chronic migraines earlier in pregnancy that responded to Fioricet but recently they seemed to have decreased in frequency  She is planning on a  with this pregnancy  Ultrasound findings: the ultrasound today shows a fetus that is growing concordant with her dates  The cervical length, amniotic fluid and placental location appear normal   No malformations are detected on today's ultrasound  The patient was informed of the findings and counseled about the limitations of the exam in detecting all forms of fetal congenital abnormalities      She does not report any vaginal bleeding or uterine cramping/contractions  She does feel fetal movement  Follow up recommended:   Recommend a follow-up ultrasound for growth and missed anatomy at 28 weeks given her history of a prior macrosomic baby and her history of gestational diabetes and a prior   The majority of time (greater then 50%) was spent on counseling and coordination of care of this patient and /or family member  Approximate face to face time was 15 minutes            Rolanda Williamson MD

## 2021-06-07 ENCOUNTER — TELEPHONE (OUTPATIENT)
Dept: PERINATAL CARE | Facility: CLINIC | Age: 31
End: 2021-06-07

## 2021-06-07 NOTE — TELEPHONE ENCOUNTER
Dany Fernandez called [facility], states she was exposed to Covid positive person last Monday () , was tested 21 not resulted yet  Started last night with congestion and runny nose, denies fever  Patient reports she does get allergies and symptoms may just be her allergies  Discussed rescheduling [facility] appt for today ( 28 week growth) she states baby active and meets kick counts, denies s/sx  labor and knows to seek emergency care any shortness of breath or chest discomfort  Dany Fernandez rescheduled [facility] appt due to covid screen pending and symptomatic- new appt  21  Reviewed with patient if covid test is positive, [facility] provider can offer virtual visit  Patient verbalized understanding of all information  Call routed to [facility] provider Dr Yolanda Cronin

## 2021-06-21 ENCOUNTER — ULTRASOUND (OUTPATIENT)
Dept: PERINATAL CARE | Facility: OTHER | Age: 31
End: 2021-06-21
Payer: COMMERCIAL

## 2021-06-21 VITALS
HEIGHT: 63 IN | HEART RATE: 89 BPM | WEIGHT: 161 LBS | DIASTOLIC BLOOD PRESSURE: 75 MMHG | SYSTOLIC BLOOD PRESSURE: 114 MMHG | BODY MASS INDEX: 28.53 KG/M2

## 2021-06-21 DIAGNOSIS — Z3A.30 30 WEEKS GESTATION OF PREGNANCY: ICD-10-CM

## 2021-06-21 DIAGNOSIS — O09.292 PRIOR FETAL MACROSOMIA IN SECOND TRIMESTER, ANTEPARTUM: Primary | ICD-10-CM

## 2021-06-21 PROCEDURE — 76816 OB US FOLLOW-UP PER FETUS: CPT | Performed by: OBSTETRICS & GYNECOLOGY

## 2021-06-21 PROCEDURE — 99213 OFFICE O/P EST LOW 20 MIN: CPT | Performed by: OBSTETRICS & GYNECOLOGY

## 2021-06-30 ENCOUNTER — ULTRASOUND (OUTPATIENT)
Dept: PERINATAL CARE | Facility: CLINIC | Age: 31
End: 2021-06-30
Payer: COMMERCIAL

## 2021-06-30 VITALS
HEIGHT: 63 IN | DIASTOLIC BLOOD PRESSURE: 59 MMHG | WEIGHT: 163.2 LBS | HEART RATE: 74 BPM | BODY MASS INDEX: 28.92 KG/M2 | SYSTOLIC BLOOD PRESSURE: 104 MMHG

## 2021-06-30 DIAGNOSIS — U07.1 COVID-19 AFFECTING PREGNANCY IN THIRD TRIMESTER: Primary | ICD-10-CM

## 2021-06-30 DIAGNOSIS — Z3A.32 32 WEEKS GESTATION OF PREGNANCY: ICD-10-CM

## 2021-06-30 DIAGNOSIS — O98.513 COVID-19 AFFECTING PREGNANCY IN THIRD TRIMESTER: Primary | ICD-10-CM

## 2021-06-30 PROCEDURE — 59025 FETAL NON-STRESS TEST: CPT | Performed by: OBSTETRICS & GYNECOLOGY

## 2021-06-30 PROCEDURE — 76815 OB US LIMITED FETUS(S): CPT | Performed by: OBSTETRICS & GYNECOLOGY

## 2021-06-30 NOTE — LETTER
June 30, 2021     [facility]  [address] [place], [place] [address]    Patient: Aiden Carrillo   YOB: 1990   Date of Visit: 6/30/2021       Dear Dr Valerie Suárez: Thank you for referring Warden Villanueva to me for evaluation  Below are my notes for this consultation  Please call us today when you get a moment (nonurgently); I would advise discontinuation of fetal monitoring for the indication of COVID infection  We are at [phone] [facility])  If you have questions, please do not hesitate to call me  Sincerely,        Carlos Pagan MD        CC: No Recipients  Carlos Pagan MD  6/30/2021  8:55 AM  Sign when Signing Visit  Reviewed referral request for fetal monitoring for the indication of recent COVID infection  Enriqueta Kimbrough did not require hospitalization and has recovered  Would advise expectant management at this time and not continue fetal monitoring for this indication  Dr Evelyn Stovall had advised final growth assessment 6 weeks from 6/21/21   Carlos Pagan MD

## 2021-06-30 NOTE — PROGRESS NOTES
NST is reactive and amniotic fluid volume is within normal limits  Reviewed referral request for fetal monitoring for the indication of recent COVID infection  Harsh Krueger did not require hospitalization and has recovered  Would advise expectant management at this time and not continue fetal monitoring for this indication  Discussed with patient; prior to cancelling any appointments will speak first with her primary OB  Dr David Ames had advised final growth assessment 6 weeks from 6/21/21   Dunia Marx MD

## 2021-06-30 NOTE — PATIENT INSTRUCTIONS
Nonstress Test for Pregnancy   WHAT YOU NEED TO KNOW:   What do I need to know about a nonstress test?  A nonstress test measures your baby's heart rate and movements  Nonstress means that no stress will be placed on your baby during the test    How do I prepare for a nonstress test?  Your healthcare provider will talk to you about how to prepare for this test  He may tell you to eat and drink plenty of fluids before your test  If you smoke, you may be asked not to smoke within 2 hours before the test  He will also tell you what medicines to take or not take on the day of your test    What will happen during a nonstress test?  You may be asked to lie down or recline back for the test on a bed  One or two belts with sensors will be placed around your abdomen  Your baby's heart rate will be recorded with a machine  If your baby does not move, your baby may be asleep  Your healthcare provider may make a noise near your abdomen to try to wake your baby  The test usually takes about 20 minutes, but can take longer if your baby needs to be awakened  What do I need to know about the test results? Your baby will be expected to move at least twice for a certain amount of time  Your baby's heart rate will be expected to go up by a certain number of beats per minute during movement  If your baby does not move as expected, the test may need to be repeated or you may need other tests  CARE AGREEMENT:   You have the right to help plan your care  Learn about your health condition and how it may be treated  Discuss treatment options with your healthcare providers to decide what care you want to receive  You always have the right to refuse treatment  The above information is an  only  It is not intended as medical advice for individual conditions or treatments  Talk to your doctor, nurse or pharmacist before following any medical regimen to see if it is safe and effective for you    © Copyright [address] [place] [address] Drive Information is for End User's use only and may not be sold, redistributed or otherwise used for commercial purposes   All illustrations and images included in CareNotes® are the copyrighted property of A D A M , Inc  or [facility] Fiona Jansen

## 2021-06-30 NOTE — PROGRESS NOTES
Non-Stress Testing:    Non-Stress test, equipment, procedure, and expected outcomes reviewed  Reviewed fetal kick counts and when to call OB  Gemini Blackburn Verified patient understanding of fetal kick counts with teach back method  Patient reports feeling daily fetal movements  Patient has no questions or concerns

## 2021-06-30 NOTE — LETTER
June 30, 2021     [place] DO  [facility] [id number]    Patient: Nhung Moser   YOB: 1990   Date of Visit: 6/30/2021       Dear Dr Sherrie Romberg: Thank you for referring Levon Zavala to me for evaluation  Below are my notes for this consultation  Please call us today when you get a moment (nonurgently); I would advise discontinuation of fetal monitoring for the indication of COVID infection  We are at [phone] [facility])  Sincerely,        Denis Moser MD        CC: No Recipients  Denis Moser MD  6/30/2021  9:04 AM  Sign when Signing Visit  NST is reactive and amniotic fluid volume is within normal limits  Reviewed referral request for fetal monitoring for the indication of recent COVID infection  Sarah Chamber did not require hospitalization and has recovered  Would advise expectant management at this time and not continue fetal monitoring for this indication  Discussed with patient; prior to cancelling any appointments will speak first with her primary OB  Dr Joanna Green had advised final growth assessment 6 weeks from 6/21/21   Denis Moser MD

## 2021-06-30 NOTE — LETTER
June 30, 2021     [place] Michie[place] Chuyita [address] [address]    Patient: Corrinne Fortis   YOB: 1990   Date of Visit: 6/30/2021       Dear Dr Tovar Maldivian: Thank you for referring Jacky Dominguez to me for evaluation  Below are my notes for this consultation  If you have questions, please do not hesitate to call me  I look forward to following your patient along with you  Sincerely,        Bertha Gilliland MD        CC: No Recipients  Bertha Gilliland MD  6/30/2021  8:48 AM  Sign when Signing Visit  Reviewed referral request for fetal monitoring for the indication of recent COVID infection  As this is not a typical indication for fetal monitoring at this time therefore would advise expectant management  Dr Edgar Montero had advised final growth assessment 6 weeks from 6/21/21   Bertha Gilliland MD

## 2021-06-30 NOTE — LETTER
NST sleeve cover sheet    Patient name: Elaina Márquez  : 1990  MRN: [id number]    KIN: Estimated Date of Delivery: 21    Obstetrician: ____________MFB__________    Reason(s) for testing:  _________Prior macrosomia?, Prior GDM?___________      Testing frequency:    ___ 2x/wk  ___ 1x/wk  ___ Dopplers  ___ BPP?       Last growth scan: __________________________________________

## 2021-07-02 ENCOUNTER — ROUTINE PRENATAL (OUTPATIENT)
Dept: PERINATAL CARE | Facility: CLINIC | Age: 31
End: 2021-07-02
Payer: COMMERCIAL

## 2021-07-02 VITALS
BODY MASS INDEX: 28.63 KG/M2 | HEART RATE: 82 BPM | HEIGHT: 63 IN | WEIGHT: 161.6 LBS | SYSTOLIC BLOOD PRESSURE: 109 MMHG | DIASTOLIC BLOOD PRESSURE: 62 MMHG

## 2021-07-02 DIAGNOSIS — O98.519 COVID-19 AFFECTING PREGNANCY, ANTEPARTUM: ICD-10-CM

## 2021-07-02 DIAGNOSIS — Z3A.32 32 WEEKS GESTATION OF PREGNANCY: Primary | ICD-10-CM

## 2021-07-02 DIAGNOSIS — U07.1 COVID-19 AFFECTING PREGNANCY, ANTEPARTUM: ICD-10-CM

## 2021-07-02 PROCEDURE — 59025 FETAL NON-STRESS TEST: CPT | Performed by: OBSTETRICS & GYNECOLOGY

## 2021-07-02 NOTE — PROGRESS NOTES
Pt  Reported active fetal movement at home between visit  Daily fetal kick count reviewed and emphasized  Patient verbalized understanding of all and was receptive      Vidya Duran RN

## 2021-07-02 NOTE — PATIENT INSTRUCTIONS
Nonstress Test for Pregnancy   WHAT YOU NEED TO KNOW:   What do I need to know about a nonstress test?  A nonstress test measures your baby's heart rate and movements  Nonstress means that no stress will be placed on your baby during the test    How do I prepare for a nonstress test?  Your healthcare provider will talk to you about how to prepare for this test  He may tell you to eat and drink plenty of fluids before your test  If you smoke, you may be asked not to smoke within 2 hours before the test  He will also tell you what medicines to take or not take on the day of your test    What will happen during a nonstress test?  You may be asked to lie down or recline back for the test on a bed  One or two belts with sensors will be placed around your abdomen  Your baby's heart rate will be recorded with a machine  If your baby does not move, your baby may be asleep  Your healthcare provider may make a noise near your abdomen to try to wake your baby  The test usually takes about 20 minutes, but can take longer if your baby needs to be awakened  What do I need to know about the test results? Your baby will be expected to move at least twice for a certain amount of time  Your baby's heart rate will be expected to go up by a certain number of beats per minute during movement  If your baby does not move as expected, the test may need to be repeated or you may need other tests  CARE AGREEMENT:   You have the right to help plan your care  Learn about your health condition and how it may be treated  Discuss treatment options with your healthcare providers to decide what care you want to receive  You always have the right to refuse treatment  The above information is an  only  It is not intended as medical advice for individual conditions or treatments  Talk to your doctor, nurse or pharmacist before following any medical regimen to see if it is safe and effective for you    © Copyright [address] [place] [address] Drive Information is for End User's use only and may not be sold, redistributed or otherwise used for commercial purposes  All illustrations and images included in CareNotes® are the copyrighted property of Denzel MARQUEZ  or [address] [place] [address] Monique Gasca in Pregnancy   AMBULATORY CARE:   Kick counts  measure how much your baby is moving in your womb  A kick from your baby can be felt as a twist, turn, swish, roll, or jab  It is common to feel your baby kicking at 26 to 28 weeks of pregnancy  You may feel your baby kick as early as 20 weeks of pregnancy  You may want to start counting at 28 weeks  Contact your healthcare provider immediately if:   · You feel a change in the number of kicks or movements of your baby  · You feel fewer than 10 kicks within 2 hours  · You have questions or concerns about your baby's movements  Why measure kick counts:  Your baby's movement may provide information about your baby's health  He or she may move less, or not at all, if there are problems  Your baby may move less if he or she is not getting enough oxygen or nutrition from the placenta  Do not smoke while you are pregnant  Smoking decreases the amount of oxygen that gets to your baby  Talk to your healthcare provider if you need help to quit smoking  Tell your healthcare provider as soon as you feel a change in your baby's movements  When to measure kick counts:   · Measure kick counts at the same time every day  · Measure kick counts when your baby is awake and most active  Your baby may be most active in the evening  How to measure kick counts:  Check that your baby is awake before you measure kick counts  You can wake up your baby by lightly pushing on your belly, walking, or drinking something cold  Your healthcare provider may tell you different ways to measure kick counts  You may be told to do the following:  · Use a chart or clock to keep track of the time you start and finish counting       · Sit in a chair or lie on your left side  · Place your hands on the largest part of your belly  · Count until you reach 10 kicks  Write down how much time it takes to count 10 kicks  · It may take 30 minutes to 2 hours to count 10 kicks  It should not take more than 2 hours to count 10 kicks  Follow up with your healthcare provider as directed:  Write down your questions so you remember to ask them during your visits  © Copyright [address] Information is for End User's use only and may not be sold, redistributed or otherwise used for commercial purposes  All illustrations and images included in CareNotes® are the copyrighted property of A D A M , Inc  or [address] [place] [address]  The above information is an  only  It is not intended as medical advice for individual conditions or treatments  Talk to your doctor, nurse or pharmacist before following any medical regimen to see if it is safe and effective for you

## 2021-07-02 NOTE — LETTER
NST sleeve cover sheet    Patient name: Maggi Herring  : 1990  MRN: [id number]    KIN: Estimated Date of Delivery: 21    Obstetrician: _______________________________    Reason(s) for testing:  __________________________________________      Testing frequency:    ___ 2x/wk  ___ 1x/wk  ___ Dopplers  ___ BPP?       Last growth scan: __________________________________________

## 2021-07-06 ENCOUNTER — ROUTINE PRENATAL (OUTPATIENT)
Dept: PERINATAL CARE | Facility: CLINIC | Age: 31
End: 2021-07-06
Payer: COMMERCIAL

## 2021-07-06 VITALS
SYSTOLIC BLOOD PRESSURE: 107 MMHG | DIASTOLIC BLOOD PRESSURE: 62 MMHG | HEIGHT: 63 IN | BODY MASS INDEX: 28.84 KG/M2 | HEART RATE: 78 BPM | WEIGHT: 162.8 LBS

## 2021-07-06 DIAGNOSIS — U07.1 COVID-19 AFFECTING PREGNANCY IN THIRD TRIMESTER: Primary | ICD-10-CM

## 2021-07-06 DIAGNOSIS — O98.513 COVID-19 AFFECTING PREGNANCY IN THIRD TRIMESTER: Primary | ICD-10-CM

## 2021-07-06 DIAGNOSIS — Z3A.32 32 WEEKS GESTATION OF PREGNANCY: ICD-10-CM

## 2021-07-06 PROCEDURE — 59025 FETAL NON-STRESS TEST: CPT | Performed by: OBSTETRICS & GYNECOLOGY

## 2021-07-06 NOTE — PATIENT INSTRUCTIONS
Kick Counts in Pregnancy   AMBULATORY CARE:   Kick counts  measure how much your baby is moving in your womb  A kick from your baby can be felt as a twist, turn, swish, roll, or jab  It is common to feel your baby kicking at 26 to 28 weeks of pregnancy  You may feel your baby kick as early as 20 weeks of pregnancy  You may want to start counting at 28 weeks  Contact your healthcare provider immediately if:   · You feel a change in the number of kicks or movements of your baby  · You feel fewer than 10 kicks within 2 hours  · You have questions or concerns about your baby's movements  Why measure kick counts:  Your baby's movement may provide information about your baby's health  He or she may move less, or not at all, if there are problems  Your baby may move less if he or she is not getting enough oxygen or nutrition from the placenta  Do not smoke while you are pregnant  Smoking decreases the amount of oxygen that gets to your baby  Talk to your healthcare provider if you need help to quit smoking  Tell your healthcare provider as soon as you feel a change in your baby's movements  When to measure kick counts:   · Measure kick counts at the same time every day  · Measure kick counts when your baby is awake and most active  Your baby may be most active in the evening  How to measure kick counts:  Check that your baby is awake before you measure kick counts  You can wake up your baby by lightly pushing on your belly, walking, or drinking something cold  Your healthcare provider may tell you different ways to measure kick counts  You may be told to do the following:  · Use a chart or clock to keep track of the time you start and finish counting  · Sit in a chair or lie on your left side  · Place your hands on the largest part of your belly  · Count until you reach 10 kicks  Write down how much time it takes to count 10 kicks  · It may take 30 minutes to 2 hours to count 10 kicks  It should not take more than 2 hours to count 10 kicks  Follow up with your healthcare provider as directed:  Write down your questions so you remember to ask them during your visits  © Copyright [address] Information is for End User's use only and may not be sold, redistributed or otherwise used for commercial purposes  All illustrations and images included in CareNotes® are the copyrighted property of A D A M , Inc  or [facility] Fiona Escalante   The above information is an  only  It is not intended as medical advice for individual conditions or treatments  Talk to your doctor, nurse or pharmacist before following any medical regimen to see if it is safe and effective for you

## 2021-07-08 ENCOUNTER — TELEPHONE (OUTPATIENT)
Dept: PERINATAL CARE | Facility: OTHER | Age: 31
End: 2021-07-08

## 2021-07-08 NOTE — TELEPHONE ENCOUNTER
Called patient to confirm follow up appointment:  After visit 6/30/21, pt no longer needs 2x weekly testing  Left voicemail confirming next appt 8/2/21  8:00  [place]    Left voicemail request patient to call back if any questions at [phone]

## 2021-08-02 ENCOUNTER — ULTRASOUND (OUTPATIENT)
Dept: PERINATAL CARE | Facility: CLINIC | Age: 31
End: 2021-08-02
Payer: COMMERCIAL

## 2021-08-02 VITALS
HEIGHT: 64 IN | DIASTOLIC BLOOD PRESSURE: 56 MMHG | WEIGHT: 173 LBS | BODY MASS INDEX: 29.53 KG/M2 | HEART RATE: 89 BPM | SYSTOLIC BLOOD PRESSURE: 111 MMHG

## 2021-08-02 DIAGNOSIS — O09.292 PRIOR FETAL MACROSOMIA IN SECOND TRIMESTER, ANTEPARTUM: Primary | ICD-10-CM

## 2021-08-02 DIAGNOSIS — Z3A.36 36 WEEKS GESTATION OF PREGNANCY: ICD-10-CM

## 2021-08-02 PROCEDURE — 76816 OB US FOLLOW-UP PER FETUS: CPT | Performed by: OBSTETRICS & GYNECOLOGY

## 2021-08-02 PROCEDURE — 99212 OFFICE O/P EST SF 10 MIN: CPT | Performed by: OBSTETRICS & GYNECOLOGY

## 2021-08-02 RX ORDER — MAGNESIUM 30 MG
30 TABLET ORAL 2 TIMES DAILY
COMMUNITY

## 2021-08-02 NOTE — PROGRESS NOTES
The patient was seen today for an ultrasound  Please see ultrasound report (located under Ob Procedures) for additional details  Thank you very much for allowing us to participate in the care of this very nice patient  Should you have any questions, please do not hesitate to contact me  Darrell Webber MD [address] [facility]  Attending Physician, [place]

## 2022-08-31 ENCOUNTER — ULTRASOUND (OUTPATIENT)
Dept: PERINATAL CARE | Facility: OTHER | Age: 32
End: 2022-08-31
Payer: COMMERCIAL

## 2022-08-31 VITALS
DIASTOLIC BLOOD PRESSURE: 67 MMHG | WEIGHT: 137.2 LBS | BODY MASS INDEX: 23.42 KG/M2 | SYSTOLIC BLOOD PRESSURE: 118 MMHG | HEIGHT: 64 IN | HEART RATE: 70 BPM

## 2022-08-31 DIAGNOSIS — O09.899 SUPERVISION OF OTHER HIGH RISK PREGNANCIES, UNSPECIFIED TRIMESTER: ICD-10-CM

## 2022-08-31 DIAGNOSIS — Z3A.01 LESS THAN 8 WEEKS GESTATION OF PREGNANCY: ICD-10-CM

## 2022-08-31 DIAGNOSIS — O36.80X0 ENCOUNTER TO DETERMINE FETAL VIABILITY OF PREGNANCY, SINGLE OR UNSPECIFIED FETUS: Primary | ICD-10-CM

## 2022-08-31 PROCEDURE — 76801 OB US < 14 WKS SINGLE FETUS: CPT | Performed by: OBSTETRICS & GYNECOLOGY

## 2022-08-31 PROCEDURE — 99213 OFFICE O/P EST LOW 20 MIN: CPT | Performed by: OBSTETRICS & GYNECOLOGY

## 2022-08-31 NOTE — PROGRESS NOTES
The patient was seen today for an ultrasound  Please see ultrasound report (located under Ob Procedures) for additional details  Thank you very much for allowing us to participate in the care of this very nice patient  Should you have any questions, please do not hesitate to contact me  Darrell Flower MD [address]  Attending Physician, [place]

## 2022-11-23 ENCOUNTER — ROUTINE PRENATAL (OUTPATIENT)
Dept: PERINATAL CARE | Facility: CLINIC | Age: 32
End: 2022-11-23

## 2022-11-23 VITALS
WEIGHT: 149 LBS | SYSTOLIC BLOOD PRESSURE: 98 MMHG | BODY MASS INDEX: 25.44 KG/M2 | HEIGHT: 64 IN | HEART RATE: 83 BPM | DIASTOLIC BLOOD PRESSURE: 54 MMHG

## 2022-11-23 DIAGNOSIS — Z36.86 ENCOUNTER FOR ANTENATAL SCREENING FOR CERVICAL LENGTH: ICD-10-CM

## 2022-11-23 DIAGNOSIS — Z3A.19 19 WEEKS GESTATION OF PREGNANCY: ICD-10-CM

## 2022-11-23 DIAGNOSIS — Z36.3 ENCOUNTER FOR ANTENATAL SCREENING FOR MALFORMATION USING ULTRASOUND: Primary | ICD-10-CM

## 2022-11-23 NOTE — PROGRESS NOTES
The patient was seen today for an ultrasound  Please see ultrasound report (located under Ob Procedures) for additional details  Thank you very much for allowing us to participate in the care of this very nice patient  Should you have any questions, please do not hesitate to contact me  Darrell Hinkle MD [address] [facility]  Attending Physician, [place]

## 2022-11-23 NOTE — PROGRESS NOTES
Ultrasound Probe Disinfection    A transvaginal ultrasound was performed  Prior to use, disinfection was performed with High Level Disinfection Process (Trophon)  Probe serial number [id number]: [id number] was used        Titi Sinclair  11/23/22  11:17 AM

## 2023-01-11 ENCOUNTER — TELEPHONE (OUTPATIENT)
Facility: HOSPITAL | Age: 33
End: 2023-01-11

## 2023-01-11 NOTE — TELEPHONE ENCOUNTER
Left voicemail for patient informing her that we had to make changes to the 2/15 appointment  Provided the patient with the updated appointment information and requested she give us a call back if she has any questions or if the new appointment does not work for her

## 2023-02-15 ENCOUNTER — TELEMEDICINE (OUTPATIENT)
Dept: PERINATAL CARE | Facility: CLINIC | Age: 33
End: 2023-02-15

## 2023-02-15 ENCOUNTER — ULTRASOUND (OUTPATIENT)
Facility: HOSPITAL | Age: 33
End: 2023-02-15

## 2023-02-15 VITALS
WEIGHT: 168.2 LBS | DIASTOLIC BLOOD PRESSURE: 62 MMHG | BODY MASS INDEX: 28.71 KG/M2 | HEART RATE: 88 BPM | SYSTOLIC BLOOD PRESSURE: 110 MMHG | HEIGHT: 64 IN

## 2023-02-15 DIAGNOSIS — Z3A.31 31 WEEKS GESTATION OF PREGNANCY: ICD-10-CM

## 2023-02-15 DIAGNOSIS — O09.292 PRIOR FETAL MACROSOMIA IN SECOND TRIMESTER, ANTEPARTUM: ICD-10-CM

## 2023-02-15 DIAGNOSIS — Z86.32 HISTORY OF GESTATIONAL DIABETES MELLITUS (GDM) IN PRIOR PREGNANCY, CURRENTLY PREGNANT IN THIRD TRIMESTER: ICD-10-CM

## 2023-02-15 DIAGNOSIS — O09.292 HISTORY OF GESTATIONAL DIABETES MELLITUS (GDM) IN PRIOR PREGNANCY, CURRENTLY PREGNANT IN SECOND TRIMESTER: ICD-10-CM

## 2023-02-15 DIAGNOSIS — O09.293 HISTORY OF GESTATIONAL DIABETES MELLITUS (GDM) IN PRIOR PREGNANCY, CURRENTLY PREGNANT IN THIRD TRIMESTER: ICD-10-CM

## 2023-02-15 DIAGNOSIS — O24.410 DIET CONTROLLED GESTATIONAL DIABETES MELLITUS (GDM) IN THIRD TRIMESTER: Primary | ICD-10-CM

## 2023-02-15 DIAGNOSIS — Z86.32 HISTORY OF GESTATIONAL DIABETES MELLITUS (GDM) IN PRIOR PREGNANCY, CURRENTLY PREGNANT IN SECOND TRIMESTER: ICD-10-CM

## 2023-02-15 DIAGNOSIS — O34.211 MATERNAL CARE DUE TO LOW TRANSVERSE UTERINE SCAR FROM PREVIOUS CESAREAN DELIVERY: ICD-10-CM

## 2023-02-15 RX ORDER — BLOOD SUGAR DIAGNOSTIC
STRIP MISCELLANEOUS
Qty: 100 STRIP | Refills: 4 | Status: SHIPPED | OUTPATIENT
Start: 2023-02-15 | End: 2023-04-13

## 2023-02-15 RX ORDER — LANCETS 33 GAUGE
EACH MISCELLANEOUS
Qty: 100 EACH | Refills: 4 | Status: SHIPPED | OUTPATIENT
Start: 2023-02-15 | End: 2023-04-13

## 2023-02-15 NOTE — LETTER
February 15, 2023     Geovanitone Rosas   [facility] [id number]    Patient: Kathryn Egan   YOB: 1990   Date of Visit: 2/15/2023       Dear Dr Haylee Jean: Thank you for referring Memosara Yoli to me for evaluation  Below are my notes for this consultation  If you have questions, please do not hesitate to call me  I look forward to following your patient along with you  Sincerely,        Brittny Lemon MD        CC: No Recipients  Brittny Lemon MD  2/15/2023  6:09 PM  Incomplete  Kathryn Egan has no complaints today  She reports regular fetal movements and does not report any problems  She is here today at 31w6d for an ultrasound for fetal growth  Problem list:  1  Prior macrosomic baby weighing 9 pounds 12 ounces and required a transverse  followed by a successful  x2  Her other children weighed 7 pounds 4 ounces and 8 pounds 5 ounces at birth  2  History of GDM in her second pregnancy although she thinks she may have had undiagnosed GDM with her first pregnancy  Recent Glucola returned at 181 by her report which gives her the diagnosis of gestational diabetes without completing a 3-hour glucose tolerance test   She reports she did start using her glucometer from a prior pregnancy today and her fasting blood sugar was 87 this morning and a 2-hour postprandial was 81   3  Short interval pregnancy    Ultrasound findings: The ultrasound today shows normal interval fetal growth and fluid  The placenta is not near her prior  scar  Pregnancy ultrasound has limitations and is unable to detect all forms of fetal congenital abnormalities  The inaccuracy in the EFW can be off by 1 lb either way in the third trimester  Specific counseling was provided on the following problems: We arranged for her to start her diabetes education through our office today   She completed class 1  If 20% or more FBS are >95 or 2 hr pp are >120 she will be started on insulin or a oral hypoglycemic such as metformin or glyburide  If medications are required to control her diabetes she will require twice weekly fetal testing with NST's from 32 weeks on  I would also recommend delivery around her due date  If she does not require any medications to control her diabetes then she can start fetal testing at 40 weeks and be delivered by 41 weeks  Postnatally after delivery, most patients with gestational diabetes can stop their insulin and gestational diabetes diet  Recommend she complete a 2 hour glucose tolerance test at 6 weeks postpartum  Patients with gestational diabetes have a higher risk for developing overt diabetes in the future  Recommend she be screened for diabetes yearly  Follow up recommended:   1  Recommend a follow-up ultrasound in 5 weeks for growth due to her new diagnosis of gestational diabetes and that she would like to   Pre visit time reviewing her records   10 minutes  Face to face time 10 minutes  Post visit time on documentation of note, updating her problem list, adding orders and prescriptions 10 minutes  Procedures that were completed today were charged separately  The level of decision making was moderate complexity      Kristal Hernandez MD

## 2023-02-15 NOTE — LETTER
February 15, 2023     DO Tone Nichole  [address] [place] [id number]    Patient: Carolann Mandel   YOB: 1990   Date of Visit: 2/15/2023       Dear Dr Elham Julien: Thank you for referring Michele Tobias to me for evaluation  Below are my notes for this consultation  If you have questions, please do not hesitate to call me  I look forward to following your patient along with you  Sincerely,        Esaw Schlatter, MD        CC: No Recipients  Esaw Schlatter, MD  2/15/2023  6:56 PM  Sign when Signing Visit  Carolann Mandel has no complaints today  She reports regular fetal movements and does not report any problems  She is here today at 31w6d for an ultrasound for fetal growth  Problem list:  1  Prior macrosomic baby weighing 9 pounds 12 ounces and required a transverse  followed by a successful  x2  Her other children weighed 7 pounds 4 ounces and 8 pounds 5 ounces at birth  2  History of GDM in her second pregnancy although she thinks she may have had undiagnosed GDM with her first pregnancy  Recent Glucola returned at 181 by her report which gives her the diagnosis of gestational diabetes without completing a 3-hour glucose tolerance test   She reports she did start using her glucometer from a prior pregnancy today and her fasting blood sugar was 87 this morning and a 2-hour postprandial was 81   3  Short interval pregnancy    Ultrasound findings: The ultrasound today shows normal interval fetal growth and fluid  The placenta is not near her prior  scar  Pregnancy ultrasound has limitations and is unable to detect all forms of fetal congenital abnormalities  The inaccuracy in the EFW can be off by 1 lb either way in the third trimester  Specific counseling was provided on the following problems:   We arranged for her to start her diabetes education through our office today  She completed class 1  If 20% or more FBS are >95 or 2 hr pp are >120 she will be started on insulin or a oral hypoglycemic such as metformin or glyburide  If medications are required to control her diabetes she will require twice weekly fetal testing with NST's from 32 weeks on  I would also recommend delivery around her due date  If she does not require any medications to control her diabetes then she can start fetal testing at 40 weeks and be delivered by 41 weeks  Postnatally after delivery, most patients with gestational diabetes can stop their insulin and gestational diabetes diet  Recommend she complete a 2 hour glucose tolerance test at 6 weeks postpartum  Patients with gestational diabetes have a higher risk for developing overt diabetes in the future  Recommend she be screened for diabetes yearly  Follow up recommended:   1  Recommend a follow-up ultrasound in 5 weeks for growth due to her new diagnosis of gestational diabetes and that she would like to   Pre visit time reviewing her records   10 minutes  Face to face time 10 minutes  Post visit time on documentation of note, updating her problem list, adding orders and prescriptions 10 minutes  Procedures that were completed today were charged separately  The level of decision making was moderate complexity      Brittny Lemon MD

## 2023-02-15 NOTE — PROGRESS NOTES
Virtual Regular Visit    [facility]    Patient is located in the following state in which I hold an active license [place]      Assessment/Plan:    Problem List Items Addressed This Visit    None           Reason for visit is   Chief Complaint   Patient presents with   • Virtual Regular Visit        Encounter provider Linh Ham    Provider located at [address] [place] [id number] [phone]      Recent Visits  No visits were found meeting these conditions  Showing recent visits within past 7 days and meeting all other requirements  Today's Visits  Date Type Provider Dept   02/15/23 1501 [facility]   Showing today's visits and meeting all other requirements  Future Appointments  No visits were found meeting these conditions  Showing future appointments within next 150 days and meeting all other requirements       The patient was identified by name and date of birth  Elida Gonzalez was informed that this is a telemedicine visit and that the visit is being conducted through the [address] [employer] platform  She agrees to proceed     My office door was closed  No one else was in the room  She acknowledged consent and understanding of privacy and security of the video platform  The patient has agreed to participate and understands they can discontinue the visit at any time  Patient is aware this is a billable service  Subjective  Elida Gonzalez is a 27 y o  female pregnant patient   HPI     Past Medical History:   Diagnosis Date   • COVID-19        Past Surgical History:   Procedure Laterality Date   • KNEE SURGERY Bilateral     x3   • [place]  DELIVERY ONLY N/A 2016    Procedure:  SECTION ();   Surgeon: Eugenio Mcneal DO;  Location: [place] ;  Service: Obstetrics       Current Outpatient Medications   Medication Sig Dispense Refill   • Prenatal Vit-Iron Carbonyl-FA (PRENATAL MULTIVITAMIN) TABS Take 1 tablet by mouth daily  No current facility-administered medications for this visit  Allergies   Allergen Reactions   • Amoxicillin Hives       Review of Systems    Video Exam    There were no vitals filed for this visit  Physical Exam --not performed  I spent 60 minutes directly with the patient during this visit           Thank you for referring your patient to [facility] Maternal Fetal Medicine Diabetes in Pregnancy Program      Corinne Castro is a  27 y o  female who presents today for Group Class 1  Patient is at [address] gestation, Estimated Date of Delivery: 23  Reviewed and updated the following from patients medical record: PMH, Problem List, Allergies, and Current Medications  Visit Diagnosis:  Diet controlled GDM    Discussed with patient pathophysiology of GDM, untreated hyperglycemia in pregnancy and maternal fetal complications including fetal macrosomia,  hypoglycemia, polyhydramnios, increased incidence of  section,  labor, and in severe cases fetal demise and still birth   Discussed importance of blood glucose monitoring, nutrition, and medication if necessary in achieving BG goals  Additional Pregnancy Complications:  History of diet controlled gestational diabetes managed by this program in 2019    Labs:    States her 1 hour GTT result was 181       Lab Results   Component Value Date    [id number] 154 2015       No results found for: GLUF, ECCFGGD9QA, JBKEDPS2MG, VJNTFHH9PV     No components found for: HGA1C    Medications:  No diabetes related medications    Anthropometrics:  Ht Readings from Last 3 Encounters:   02/15/23 5' 3 5" (1 613 m)   22 5' 3 5" (1 613 m)   22 5' 3 5" (1 613 m)     Wt Readings from Last 3 Encounters:   02/15/23 76 3 kg (168 lb 3 2 oz)   22 67 6 kg (149 lb)   22 62 2 kg (137 lb 3 2 oz) Pre-gravid weight: 137 2  Pre-gravid BMI: 23 92  Weight Change: gained 30 9 pounds  Weight gain recommendations: BMI (18 5-24 9) 25-35 lbs  Comments: Patient may gain 4 more pounds for the remainder of the pregnancy  Recent Ultra Sound Results:  Date: 22; Results of today's ultrasound are pending  Fetal Growth: Normal  KEIRA: Normal  Next US date: 3/22/23    Blood Glucose Monitoring:   Glucose Meter: OneTouch Verio Flex; Still has the same meter from last pregnancy  Advised her to change the battery in her meter from 2019  Instructed on testing blood sugars: 4 x per day (Fasting, 2 hour after start of each meal)    Gave instruction on site selection, skin preparation, loading strips and lancet device, meter activation, obtaining blood sample, test strip and lancet disposal and storage, and recording log book entries  Patient has good understanding of material covered and was able to test their own blood sugar in office today  Instruction for reporting blood sugar results weekly via:  Phone: [phone]   OR  My Chart (Message with image attachment, or Glucose Flowsheet)    Goal Blood Sugar Ranges:   Fastin-90 mg/dL  1 hour after the start of each meal: 140 mg/dL or less  2 hours after start of each meal: 120 mg/dL or less    Meal Plan (daily calorie and protein needs):  Calories: 2000 calorie (CHO:45-15-60-15-60-30) (PRO: 2/3-1-3/4-1-3/4-2)    Type of Diet:Regular  Additional Nutrition Concerns: Reports she lost weight when was following the 1900 calorie mal plan with last pregnancy & needed higher calories  Allergy--nuts    Meal Plan Tips:  1  Patient was provided with a meal plan including 3 meals and 3 snacks  2  Discussed appropriate amounts of CHO, PRO, and Fat at each meal and snack  3  Reviewed CHO exchange list, and portion sizes for both CHO and PRO via food models  4  Instruction on how to read a food label  5   Provided suggested meal/snack options to increase nutrition and maintain consistent meal and snack intakes  6  Instructed on how to keep a 3-day food diary to be brought to follow- up appointment  7  Encouraged  patient to eat every 2 0-3 5 hours while awake  8  Encouraged patient to go no longer than 8-10 hours fasting overnight until first meal of the day  Physical Activity:  Discussed benefits of physical activity to optimize blood glucose control, encouraged activity at patient is physically able  Always consult a physician prior to starting an exercise program  Recommend 20-30 minutes daily  Patient Stated Goal: "I will plan my meals and snacks every day"    Diabetes Self Management Support Plan outside of ongoing care: Spouse/Family    Learner/s Present:Learners Present: Patient   Barriers to Learning/Change: Financial  Expected Compliance: good    Date to report blood sugars: Tuesday, 2/21/23  Class 2 (date): Wednesday, 2/22/23    Begin Time: 1 PM  End Time: 2 PM    It was a pleasure working with them today  Please feel free to call with any questions or concerns      Bufford Snellen  Diabetes Educator  [facility] Maternal Fetal Medicine  Diabetes in Pregnancy Program  [address] [place] [address],[address]  [address]

## 2023-02-15 NOTE — PROGRESS NOTES
Idalia Montero has no complaints today  She reports regular fetal movements and does not report any problems  She is here today at 31w6d for an ultrasound for fetal growth  Problem list:  1  Prior macrosomic baby weighing 9 pounds 12 ounces and required a transverse  followed by a successful  x2  Her other children weighed 7 pounds 4 ounces and 8 pounds 5 ounces at birth  2  History of GDM in her second pregnancy although she thinks she may have had undiagnosed GDM with her first pregnancy  Recent Glucola returned at 181 by her report which gives her the diagnosis of gestational diabetes without completing a 3-hour glucose tolerance test   She reports she did start using her glucometer from a prior pregnancy today and her fasting blood sugar was 87 this morning and a 2-hour postprandial was 81   3  Short interval pregnancy    Ultrasound findings: The ultrasound today shows normal interval fetal growth and fluid  The placenta is not near her prior  scar  Pregnancy ultrasound has limitations and is unable to detect all forms of fetal congenital abnormalities  The inaccuracy in the EFW can be off by 1 lb either way in the third trimester  Specific counseling was provided on the following problems: We arranged for her to start her diabetes education through our office today  She completed class 1  If 20% or more FBS are >95 or 2 hr pp are >120 she will be started on insulin or a oral hypoglycemic such as metformin or glyburide  If medications are required to control her diabetes she will require twice weekly fetal testing with NST's from 32 weeks on  I would also recommend delivery around her due date  If she does not require any medications to control her diabetes then she can start fetal testing at 40 weeks and be delivered by 41 weeks  Postnatally after delivery, most patients with gestational diabetes can stop their insulin and gestational diabetes diet  Recommend she complete a 2 hour glucose tolerance test at 6 weeks postpartum  Patients with gestational diabetes have a higher risk for developing overt diabetes in the future  Recommend she be screened for diabetes yearly  Follow up recommended:   1  Recommend a follow-up ultrasound in 5 weeks for growth due to her new diagnosis of gestational diabetes and that she would like to   Pre visit time reviewing her records   10 minutes  Face to face time 10 minutes  Post visit time on documentation of note, updating her problem list, adding orders and prescriptions 10 minutes  Procedures that were completed today were charged separately  The level of decision making was moderate complexity      Sandee Peng MD

## 2023-02-22 ENCOUNTER — TELEMEDICINE (OUTPATIENT)
Facility: HOSPITAL | Age: 33
End: 2023-02-22

## 2023-02-22 DIAGNOSIS — O09.293 HISTORY OF GESTATIONAL DIABETES IN PRIOR PREGNANCY, CURRENTLY PREGNANT IN THIRD TRIMESTER: ICD-10-CM

## 2023-02-22 DIAGNOSIS — O09.293 HISTORY OF MACROSOMIA IN INFANT IN PRIOR PREGNANCY, CURRENTLY PREGNANT IN THIRD TRIMESTER: ICD-10-CM

## 2023-02-22 DIAGNOSIS — Z3A.32 32 WEEKS GESTATION OF PREGNANCY: ICD-10-CM

## 2023-02-22 DIAGNOSIS — Z86.32 HISTORY OF GESTATIONAL DIABETES IN PRIOR PREGNANCY, CURRENTLY PREGNANT IN THIRD TRIMESTER: ICD-10-CM

## 2023-02-22 DIAGNOSIS — O24.410 DIET CONTROLLED GESTATIONAL DIABETES MELLITUS (GDM) IN THIRD TRIMESTER: Primary | ICD-10-CM

## 2023-02-22 NOTE — PROGRESS NOTES
CLASS 2 - Group  (virtual visit)    Thank you for referring your patient to [facility] Diabetes and Pregnancy Program      Jose Ramon Romero is a  27 y o  female who presents today unaccompanied for Virtual Regular Visit (Gwendolyn Le; Link via Text Message), Patient Education (Class 2; Group), and Gestational Diabetes (32w6d)  Patient is at [facility] gestation, Estimated Date of Delivery: 4/13/23  Visit Diagnosis:  Encounter Diagnosis     ICD-10-CM    1  Diet controlled gestational diabetes mellitus (GDM) in third trimester  O24 410       2  32 weeks gestation of pregnancy  Z3A 32       3  History of gestational diabetes in prior pregnancy, currently pregnant in third trimester  O09 293     Z86 32       4  History of macrosomia in infant in prior pregnancy, currently pregnant in third trimester  O09 293            Reviewed and updated the following from patients medical record: PMH, Problem List, Allergies, and Current Medications  Labs  GDM LABS: See Class 1 Note    Labs Ordered This Visit: None    Current Medications:    Current Outpatient Medications:   •  OneTouch Delica Lancets 26W MISC, Test 4 times daily, Disp: 100 each, Rfl: 4  •  OneTouch Verio test strip, Test 4 times daily, Disp: 100 strip, Rfl: 4  •  Prenatal Vit-Iron Carbonyl-FA (PRENATAL MULTIVITAMIN) TABS, Take 1 tablet by mouth daily  , Disp: , Rfl:      Anthropometrics:  Ht Readings from Last 1 Encounters:   02/15/23 5' 3 5" (1 613 m)      Wt Readings from Last 3 Encounters:   02/15/23 76 3 kg (168 lb 3 2 oz)   11/23/22 67 6 kg (149 lb)   08/31/22 62 2 kg (137 lb 3 2 oz)        Pre-Gravid Wt Pre-Gravid BMI TWG   62 2 kg (137 lb 3 2 oz) 23 92 14 1 kg (31 lb)     Total Pregnancy Weight Gain Recommendations: BMI (18 5-24 9) 25-35 lbs  • Current Wt Status Compared to Recommendations:  Within Range -- Continue recommended rate of weight gain based on pre-pregnancy BMI till delivery    Most Recent Ultrasound Results:  • Findings: NML Growth/KEIRA per OB   o Further Fetal Surveillance: None  • Next US date: Scheduled Appropriately    BLOOD GLUCOSE MONITORING:   Glucometer: OneTouch Verio Flex     Reinforced at [employer] Visit:   • Timing/Frequency of SMB x per day (Fasting, 2 hour after start of each meal)  • Goals: (Fasting) 60-90mg/dl // (1hr PP) <140mg/dl // (2hr PP) <120mg/dl  • Reporting Guidelines: Weekly via Phone: [phone] OR My Chart (Message with image attachment) OR Glucose Flowsheet  o Method of Reporting: [employer] Glucose Flowsheet    BG LOG:         Review of Blood Glucose Log:   • Indicates good glycemic control  • FBG = Well controlled  • Post-Prandial BG = Well controlled    MEAL PLAN (Patient was provided with a meal plan including 3 meals and 3 snacks at class 1)  *Calories: 2000 calorie (CHO:45-15-60-15-60-30) (PRO: 2/3-1-3/4-1-3/4-2)    Reinforced Diet Instructions:  1  Individualized meal plan  2  Importance of consistent carbohydrate intake via 3 meals and 3 snacks per day   3  Importance of protein as it relates to blood glucose control  4  Encouraged  patient to eat every 2 0-3 5 hours while awake  5  Encouraged patient to go no longer than 8-10 hours fasting overnight until first meal of the day  6  Provided suggested meal/snack options to increase nutrition and maintain consistent meal and snack intakes  Physical Activity:    Reviewed w/ Pt:   • Benefits of physical activity to optimize blood glucose control, encouraged activity at patient is physically able    o Instructed pt to always consult a physician prior to starting an exercise program    • Recommend 20-30 minutes daily      Additional Topics Reviewed:    • Medications: (reviewed options available with pt)  o Discussed if blood sugars are not within normal range with meal planning and exercise  o Reviewed medication such as metformin and/or basal/bolus insulin may be needed for better glucose control  • Maternal-Fetal Testing:   o Ultrasounds: growth scans every 4 weeks  o NST: twice weekly starting at 32nd week GA  o KEIRA:  weekly starting at 32 weeks GA  • Sick day Guidelines:   o Advised that sickness will raise blood sugar   o If blood sugar is > 160 mg/dL twice in one day call doctor  o If on diabetes medications, continue as instructed   o If unable to consume normal meal plan, instructed to remain well hydrated   • Hypoglycemia & Treatment Guidelines:  o Reviewed what hypoglycemia is, signs and symptoms, and how to treat via the 15:15 rule  • Post-Partum Guidelines:  o Completion of 75 gm CHO 2 hr gtt at 6 weeks post-partum to check for Type 2 DM diagnosis  • Breastfeeding Guidelines:  o Continue GDM meal plan plus additional 350-500 calories daily  - Examples of protein and carbohydrate snacks provided  o Stay hydrated by drinking 8-10 (8 oz ) fluids daily  • Dining Out & Travel Guidelines:  o Patient advised to be prepared with extra diabetes supplies, medications, and snacks, as well as sticking to the same time schedule and portions eaten at home for meals and snacks  Patient Stated Goal: "I will plan my meals and snacks every day"  Goal Assessment: On track    Diabetes Self Management Support Plan outside of ongoing care: Spouse/Family    Barriers to Learning/Change: Financial  Expected Compliance: good    Date to report blood sugars: Weekly   Follow up:  Return per weekly review of blood sugar log  Begin Time: 1:00pm  End Time: 2:06pm    It was a pleasure working with them today  Please feel free to call ([phone]) with any questions or concerns      Rafael Salas RD   Diabetes Educator  [facility] Maternal Fetal Medicine  Diabetes and Pregnancy Program  [address] [place] [address] 54, [address] [facility]      Virtual Regular Visit    Verification of patient location:    Patient is located in the following state in which I hold an active license [place]      Assessment/Plan:    Problem List Items Addressed This Visit     31 weeks gestation of pregnancy    Diet controlled gestational diabetes mellitus (GDM) in third trimester - Primary    History of gestational diabetes mellitus (GDM) in prior pregnancy, currently pregnant in second trimester    Prior fetal macrosomia in second trimester, antepartum            Reason for visit is   Chief Complaint   Patient presents with   • Virtual Regular Visit     Haroon Tobias; Link via Text Message   • Patient Education     Class 2; Group   • Gestational Diabetes     32w6d        Encounter provider Elpidio Navarro RD    Provider located at [facility] [id number]      Recent Visits  No visits were found meeting these conditions  Showing recent visits within past 7 days and meeting all other requirements  Today's Visits  Date Type Provider Dept   23 Telemedicine Elpidio Navarro RD An    Showing today's visits and meeting all other requirements  Future Appointments  No visits were found meeting these conditions  Showing future appointments within next 150 days and meeting all other requirements       The patient was identified by name and date of birth  Nhung Moser was informed that this is a telemedicine visit and that the visit is being conducted through the [address] Now platform  She agrees to proceed     My office door was closed  No one else was in the room  She acknowledged consent and understanding of privacy and security of the video platform  The patient has agreed to participate and understands they can discontinue the visit at any time  Patient is aware this is a billable service  Subjective  Nhung Moser is a 27 y o  female pregnant        HPI     Past Medical History:   Diagnosis Date   • COVID-19        Past Surgical History:   Procedure Laterality Date   • KNEE SURGERY Bilateral     x3   • [place]  DELIVERY ONLY N/A 2016    Procedure:  SECTION (); Surgeon: Lindsay Avitia DO;  Location: [facility];  Service: Obstetrics       Current Outpatient Medications   Medication Sig Dispense Refill   • OneTouch Delica Lancets 88C MISC Test 4 times daily 100 each 4   • OneTouch Verio test strip Test 4 times daily 100 strip 4   • Prenatal Vit-Iron Carbonyl-FA (PRENATAL MULTIVITAMIN) TABS Take 1 tablet by mouth daily  No current facility-administered medications for this visit  Allergies   Allergen Reactions   • Amoxicillin Hives       Review of Systems   Unable to perform ROS: Other       Video Exam    There were no vitals filed for this visit  Physical Exam  Constitutional:       Appearance: Normal appearance  Comments: Limited Assessment r/t Virtual Visit   Neurological:      Mental Status: She is alert            I spent 66 minutes directly with the patient during this visit

## 2023-03-22 ENCOUNTER — ULTRASOUND (OUTPATIENT)
Facility: HOSPITAL | Age: 33
End: 2023-03-22

## 2023-03-22 VITALS
SYSTOLIC BLOOD PRESSURE: 116 MMHG | WEIGHT: 160.27 LBS | HEIGHT: 63 IN | BODY MASS INDEX: 28.4 KG/M2 | HEART RATE: 97 BPM | DIASTOLIC BLOOD PRESSURE: 68 MMHG

## 2023-03-22 DIAGNOSIS — O24.410 DIET CONTROLLED GESTATIONAL DIABETES MELLITUS (GDM) IN THIRD TRIMESTER: ICD-10-CM

## 2023-03-22 DIAGNOSIS — Z3A.36 36 WEEKS GESTATION OF PREGNANCY: Primary | ICD-10-CM

## 2023-03-22 NOTE — PATIENT INSTRUCTIONS
Thank you for choosing us for your  care today  If you have any questions about your ultrasound or care, please do not hesitate to contact us or your primary obstetrician  Some general instructions for your pregnancy are:    Protect against coronavirus: get vaccinated - pregnant women are increased risk of severe COVID  Notify your primary care doctor if you have any symptoms  Exercise: Aim for 22 minutes per day (150 minutes per week) of regular exercise  Walking is great! Nutrition: aim for calcium-rich and iron-rich foods as well as healthy sources of protein  Learn about Preeclampsia: preeclampsia is a common, serious high blood pressure complication in pregnancy  A blood pressure of 029KGVW (systolic or top number) or 38MFJB (diastolic or bottom number) is not normal and needs evaluation by your doctor  Aspirin is sometimes prescribed in early pregnancy to prevent preeclampsia in women with risk factors - ask your obstetrician if you should be on this medication  If you smoke, try to reduce how many cigarettes you smoke or try to quit completely  Do not vape  Other warning signs to watch out for in pregnancy or postpartum: chest pain, obstructed breathing or shortness of breath, seizures, thoughts of hurting yourself or your baby, bleeding, a painful or swollen leg, fever, or headache (see [facility] POST-BIRTH Warning Signs campaign)  If these happen call 911  Itching is also not normal in pregnancy and if you experience this, especially over your hands and feet, potentially worse at night, notify your doctors

## 2023-04-06 DIAGNOSIS — Z86.32 HISTORY OF GESTATIONAL DIABETES: Primary | ICD-10-CM

## 2023-04-06 DIAGNOSIS — Z13.1 SCREENING FOR DIABETES MELLITUS: ICD-10-CM

## 2023-04-06 DIAGNOSIS — O24.410 DIET CONTROLLED GESTATIONAL DIABETES MELLITUS (GDM) IN THIRD TRIMESTER: ICD-10-CM

## 2023-07-15 NOTE — PROGRESS NOTES
The patient was seen today for an ultrasound  Please see ultrasound report (located under Ob Procedures) for additional details  Thank you very much for allowing us to participate in the care of this very nice patient  Should you have any questions, please do not hesitate to contact me  Darrell Way MD [address]  Attending Physician, [facility]